# Patient Record
Sex: FEMALE | Race: BLACK OR AFRICAN AMERICAN | Employment: FULL TIME | URBAN - METROPOLITAN AREA
[De-identification: names, ages, dates, MRNs, and addresses within clinical notes are randomized per-mention and may not be internally consistent; named-entity substitution may affect disease eponyms.]

---

## 2019-12-12 ENCOUNTER — HOSPITAL ENCOUNTER (OUTPATIENT)
Dept: LAB | Age: 34
Discharge: HOME | End: 2019-12-12

## 2019-12-12 LAB — XX-LABCORP SPECIMEN COL,LCBCF: NORMAL

## 2019-12-12 PROCEDURE — 99001 SPECIMEN HANDLING PT-LAB: CPT

## 2019-12-18 ENCOUNTER — HOSPITAL ENCOUNTER (INPATIENT)
Age: 34
LOS: 2 days | Discharge: HOME | DRG: 742 | End: 2019-12-20
Attending: OBSTETRICS & GYNECOLOGY | Admitting: OBSTETRICS & GYNECOLOGY
Payer: COMMERCIAL

## 2019-12-18 ENCOUNTER — ANESTHESIA EVENT (OUTPATIENT)
Dept: SURGERY | Age: 34
DRG: 742 | End: 2019-12-18
Payer: COMMERCIAL

## 2019-12-18 ENCOUNTER — ANESTHESIA (OUTPATIENT)
Dept: SURGERY | Age: 34
DRG: 742 | End: 2019-12-18
Payer: COMMERCIAL

## 2019-12-18 PROBLEM — G89.29 CHRONIC PELVIC PAIN IN FEMALE: Status: ACTIVE | Noted: 2019-12-18

## 2019-12-18 PROBLEM — R10.2 CHRONIC PELVIC PAIN IN FEMALE: Status: ACTIVE | Noted: 2019-12-18

## 2019-12-18 PROBLEM — N92.1 MENORRHAGIA WITH IRREGULAR CYCLE: Status: ACTIVE | Noted: 2019-12-18

## 2019-12-18 LAB
ABO + RH BLD: NORMAL
BLOOD GROUP ANTIBODIES SERPL: NORMAL
HCG UR QL: NEGATIVE
HCT VFR BLD AUTO: 36.6 % (ref 35–45)
HGB BLD-MCNC: 11.5 G/DL (ref 12–16)
SPECIMEN EXP DATE BLD: NORMAL

## 2019-12-18 PROCEDURE — 0UT5FZZ RESECTION OF RIGHT FALLOPIAN TUBE, VIA NATURAL OR ARTIFICIAL OPENING WITH PERCUTANEOUS ENDOSCOPIC ASSISTANCE: ICD-10-PCS | Performed by: OBSTETRICS & GYNECOLOGY

## 2019-12-18 PROCEDURE — 0TP98DZ REMOVAL OF INTRALUMINAL DEVICE FROM URETER, VIA NATURAL OR ARTIFICIAL OPENING ENDOSCOPIC: ICD-10-PCS | Performed by: UROLOGY

## 2019-12-18 PROCEDURE — 88307 TISSUE EXAM BY PATHOLOGIST: CPT

## 2019-12-18 PROCEDURE — 36415 COLL VENOUS BLD VENIPUNCTURE: CPT

## 2019-12-18 PROCEDURE — 76060000039 HC ANESTHESIA 4 TO 4.5 HR: Performed by: OBSTETRICS & GYNECOLOGY

## 2019-12-18 PROCEDURE — 77030031139 HC SUT VCRL2 J&J -A: Performed by: OBSTETRICS & GYNECOLOGY

## 2019-12-18 PROCEDURE — 86900 BLOOD TYPING SEROLOGIC ABO: CPT

## 2019-12-18 PROCEDURE — 0TQB8ZZ REPAIR BLADDER, VIA NATURAL OR ARTIFICIAL OPENING ENDOSCOPIC: ICD-10-PCS | Performed by: UROLOGY

## 2019-12-18 PROCEDURE — 99218 HC RM OBSERVATION: CPT

## 2019-12-18 PROCEDURE — 74011000250 HC RX REV CODE- 250: Performed by: NURSE ANESTHETIST, CERTIFIED REGISTERED

## 2019-12-18 PROCEDURE — 74011250636 HC RX REV CODE- 250/636: Performed by: ANESTHESIOLOGY

## 2019-12-18 PROCEDURE — 74011250636 HC RX REV CODE- 250/636: Performed by: OBSTETRICS & GYNECOLOGY

## 2019-12-18 PROCEDURE — 77030008477 HC STYL SATN SLP COVD -A: Performed by: NURSE ANESTHETIST, CERTIFIED REGISTERED

## 2019-12-18 PROCEDURE — 77030018836 HC SOL IRR NACL ICUM -A: Performed by: OBSTETRICS & GYNECOLOGY

## 2019-12-18 PROCEDURE — 0T788DZ DILATION OF BILATERAL URETERS WITH INTRALUMINAL DEVICE, VIA NATURAL OR ARTIFICIAL OPENING ENDOSCOPIC: ICD-10-PCS | Performed by: UROLOGY

## 2019-12-18 PROCEDURE — C1758 CATHETER, URETERAL: HCPCS | Performed by: OBSTETRICS & GYNECOLOGY

## 2019-12-18 PROCEDURE — 77030008608 HC TRCR ENDOSC SMTH AMR -B: Performed by: OBSTETRICS & GYNECOLOGY

## 2019-12-18 PROCEDURE — 77030019927 HC TBNG IRR CYSTO BAXT -A: Performed by: OBSTETRICS & GYNECOLOGY

## 2019-12-18 PROCEDURE — 77030008574 HC TBNG SUC IRR STRY -B: Performed by: OBSTETRICS & GYNECOLOGY

## 2019-12-18 PROCEDURE — 77030002916 HC SUT ETHLN J&J -A: Performed by: OBSTETRICS & GYNECOLOGY

## 2019-12-18 PROCEDURE — 77030003679 HC NDL SPN TERU -A: Performed by: OBSTETRICS & GYNECOLOGY

## 2019-12-18 PROCEDURE — 0UT9FZZ RESECTION OF UTERUS, VIA NATURAL OR ARTIFICIAL OPENING WITH PERCUTANEOUS ENDOSCOPIC ASSISTANCE: ICD-10-PCS | Performed by: OBSTETRICS & GYNECOLOGY

## 2019-12-18 PROCEDURE — 77030020782 HC GWN BAIR PAWS FLX 3M -B: Performed by: OBSTETRICS & GYNECOLOGY

## 2019-12-18 PROCEDURE — 74011250636 HC RX REV CODE- 250/636: Performed by: NURSE ANESTHETIST, CERTIFIED REGISTERED

## 2019-12-18 PROCEDURE — 74011000254 HC RX REV CODE- 254: Performed by: NURSE ANESTHETIST, CERTIFIED REGISTERED

## 2019-12-18 PROCEDURE — 85018 HEMOGLOBIN: CPT

## 2019-12-18 PROCEDURE — 74011000250 HC RX REV CODE- 250: Performed by: OBSTETRICS & GYNECOLOGY

## 2019-12-18 PROCEDURE — 76210000016 HC OR PH I REC 1 TO 1.5 HR: Performed by: OBSTETRICS & GYNECOLOGY

## 2019-12-18 PROCEDURE — 77030003029 HC SUT VCRL J&J -B: Performed by: OBSTETRICS & GYNECOLOGY

## 2019-12-18 PROCEDURE — 77030003578 HC NDL INSUF VERES AMR -B: Performed by: OBSTETRICS & GYNECOLOGY

## 2019-12-18 PROCEDURE — 77030008683 HC TU ET CUF COVD -A: Performed by: NURSE ANESTHETIST, CERTIFIED REGISTERED

## 2019-12-18 PROCEDURE — 77030010030: Performed by: OBSTETRICS & GYNECOLOGY

## 2019-12-18 PROCEDURE — 77030006643: Performed by: NURSE ANESTHETIST, CERTIFIED REGISTERED

## 2019-12-18 PROCEDURE — 77030040361 HC SLV COMPR DVT MDII -B: Performed by: OBSTETRICS & GYNECOLOGY

## 2019-12-18 PROCEDURE — 76010000135 HC OR TIME 4 TO 4.5 HR: Performed by: OBSTETRICS & GYNECOLOGY

## 2019-12-18 PROCEDURE — 77030002888 HC SUT CHRMC J&J -A: Performed by: OBSTETRICS & GYNECOLOGY

## 2019-12-18 PROCEDURE — C1769 GUIDE WIRE: HCPCS | Performed by: OBSTETRICS & GYNECOLOGY

## 2019-12-18 PROCEDURE — 81025 URINE PREGNANCY TEST: CPT

## 2019-12-18 PROCEDURE — 77030012770 HC TRCR OPT FX AMR -B: Performed by: OBSTETRICS & GYNECOLOGY

## 2019-12-18 PROCEDURE — 0DNW4ZZ RELEASE PERITONEUM, PERCUTANEOUS ENDOSCOPIC APPROACH: ICD-10-PCS | Performed by: OBSTETRICS & GYNECOLOGY

## 2019-12-18 PROCEDURE — 77030040830 HC CATH URETH FOL MDII -A: Performed by: OBSTETRICS & GYNECOLOGY

## 2019-12-18 PROCEDURE — 77030020829: Performed by: OBSTETRICS & GYNECOLOGY

## 2019-12-18 PROCEDURE — 74011000258 HC RX REV CODE- 258: Performed by: OBSTETRICS & GYNECOLOGY

## 2019-12-18 PROCEDURE — 77030011264 HC ELECTRD BLD EXT COVD -A: Performed by: OBSTETRICS & GYNECOLOGY

## 2019-12-18 PROCEDURE — 77030005537 HC CATH URETH BARD -A: Performed by: OBSTETRICS & GYNECOLOGY

## 2019-12-18 PROCEDURE — 0TJB8ZZ INSPECTION OF BLADDER, VIA NATURAL OR ARTIFICIAL OPENING ENDOSCOPIC: ICD-10-PCS | Performed by: UROLOGY

## 2019-12-18 RX ORDER — ALBUTEROL SULFATE 0.83 MG/ML
2.5 SOLUTION RESPIRATORY (INHALATION) AS NEEDED
Status: DISCONTINUED | OUTPATIENT
Start: 2019-12-18 | End: 2019-12-18 | Stop reason: HOSPADM

## 2019-12-18 RX ORDER — MAGNESIUM SULFATE 100 %
4 CRYSTALS MISCELLANEOUS AS NEEDED
Status: DISCONTINUED | OUTPATIENT
Start: 2019-12-18 | End: 2019-12-18 | Stop reason: HOSPADM

## 2019-12-18 RX ORDER — SODIUM CHLORIDE, SODIUM LACTATE, POTASSIUM CHLORIDE, CALCIUM CHLORIDE 600; 310; 30; 20 MG/100ML; MG/100ML; MG/100ML; MG/100ML
125 INJECTION, SOLUTION INTRAVENOUS CONTINUOUS
Status: DISCONTINUED | OUTPATIENT
Start: 2019-12-18 | End: 2019-12-20 | Stop reason: HOSPADM

## 2019-12-18 RX ORDER — ACETAMINOPHEN 10 MG/ML
1000 INJECTION, SOLUTION INTRAVENOUS ONCE
Status: COMPLETED | OUTPATIENT
Start: 2019-12-18 | End: 2019-12-18

## 2019-12-18 RX ORDER — DIPHENHYDRAMINE HYDROCHLORIDE 50 MG/ML
12.5 INJECTION, SOLUTION INTRAMUSCULAR; INTRAVENOUS
Status: DISCONTINUED | OUTPATIENT
Start: 2019-12-18 | End: 2019-12-18 | Stop reason: HOSPADM

## 2019-12-18 RX ORDER — KETOROLAC TROMETHAMINE 30 MG/ML
30 INJECTION, SOLUTION INTRAMUSCULAR; INTRAVENOUS EVERY 6 HOURS
Status: DISCONTINUED | OUTPATIENT
Start: 2019-12-18 | End: 2019-12-20 | Stop reason: HOSPADM

## 2019-12-18 RX ORDER — SODIUM CHLORIDE 0.9 % (FLUSH) 0.9 %
5-40 SYRINGE (ML) INJECTION EVERY 8 HOURS
Status: DISCONTINUED | OUTPATIENT
Start: 2019-12-18 | End: 2019-12-20 | Stop reason: HOSPADM

## 2019-12-18 RX ORDER — DEXAMETHASONE SODIUM PHOSPHATE 4 MG/ML
INJECTION, SOLUTION INTRA-ARTICULAR; INTRALESIONAL; INTRAMUSCULAR; INTRAVENOUS; SOFT TISSUE AS NEEDED
Status: DISCONTINUED | OUTPATIENT
Start: 2019-12-18 | End: 2019-12-18 | Stop reason: HOSPADM

## 2019-12-18 RX ORDER — BUPIVACAINE HYDROCHLORIDE AND EPINEPHRINE 5; 5 MG/ML; UG/ML
INJECTION, SOLUTION EPIDURAL; INTRACAUDAL; PERINEURAL AS NEEDED
Status: DISCONTINUED | OUTPATIENT
Start: 2019-12-18 | End: 2019-12-18 | Stop reason: HOSPADM

## 2019-12-18 RX ORDER — PROPOFOL 10 MG/ML
INJECTION, EMULSION INTRAVENOUS AS NEEDED
Status: DISCONTINUED | OUTPATIENT
Start: 2019-12-18 | End: 2019-12-18 | Stop reason: HOSPADM

## 2019-12-18 RX ORDER — FENTANYL CITRATE 50 UG/ML
25 INJECTION, SOLUTION INTRAMUSCULAR; INTRAVENOUS
Status: DISCONTINUED | OUTPATIENT
Start: 2019-12-18 | End: 2019-12-18 | Stop reason: HOSPADM

## 2019-12-18 RX ORDER — LIDOCAINE HYDROCHLORIDE 20 MG/ML
INJECTION, SOLUTION EPIDURAL; INFILTRATION; INTRACAUDAL; PERINEURAL AS NEEDED
Status: DISCONTINUED | OUTPATIENT
Start: 2019-12-18 | End: 2019-12-18 | Stop reason: HOSPADM

## 2019-12-18 RX ORDER — ONDANSETRON 2 MG/ML
4 INJECTION INTRAMUSCULAR; INTRAVENOUS
Status: DISCONTINUED | OUTPATIENT
Start: 2019-12-18 | End: 2019-12-20 | Stop reason: HOSPADM

## 2019-12-18 RX ORDER — ONDANSETRON 2 MG/ML
INJECTION INTRAMUSCULAR; INTRAVENOUS AS NEEDED
Status: DISCONTINUED | OUTPATIENT
Start: 2019-12-18 | End: 2019-12-18 | Stop reason: HOSPADM

## 2019-12-18 RX ORDER — NALOXONE HYDROCHLORIDE 0.4 MG/ML
0.1 INJECTION, SOLUTION INTRAMUSCULAR; INTRAVENOUS; SUBCUTANEOUS AS NEEDED
Status: DISCONTINUED | OUTPATIENT
Start: 2019-12-18 | End: 2019-12-18 | Stop reason: HOSPADM

## 2019-12-18 RX ORDER — OXYCODONE AND ACETAMINOPHEN 5; 325 MG/1; MG/1
1 TABLET ORAL
Status: DISCONTINUED | OUTPATIENT
Start: 2019-12-18 | End: 2019-12-20 | Stop reason: HOSPADM

## 2019-12-18 RX ORDER — CEFAZOLIN SODIUM/WATER 2 G/20 ML
2 SYRINGE (ML) INTRAVENOUS ONCE
Status: COMPLETED | OUTPATIENT
Start: 2019-12-18 | End: 2019-12-18

## 2019-12-18 RX ORDER — DIPHENHYDRAMINE HCL 25 MG
25 CAPSULE ORAL
Status: DISCONTINUED | OUTPATIENT
Start: 2019-12-18 | End: 2019-12-20 | Stop reason: HOSPADM

## 2019-12-18 RX ORDER — ACETAMINOPHEN 325 MG/1
650 TABLET ORAL
Status: DISCONTINUED | OUTPATIENT
Start: 2019-12-18 | End: 2019-12-20 | Stop reason: HOSPADM

## 2019-12-18 RX ORDER — MIDAZOLAM HYDROCHLORIDE 1 MG/ML
INJECTION, SOLUTION INTRAMUSCULAR; INTRAVENOUS AS NEEDED
Status: DISCONTINUED | OUTPATIENT
Start: 2019-12-18 | End: 2019-12-18 | Stop reason: HOSPADM

## 2019-12-18 RX ORDER — HYDROMORPHONE HYDROCHLORIDE 1 MG/ML
1 INJECTION, SOLUTION INTRAMUSCULAR; INTRAVENOUS; SUBCUTANEOUS
Status: DISCONTINUED | OUTPATIENT
Start: 2019-12-18 | End: 2019-12-19

## 2019-12-18 RX ORDER — HYDROCODONE BITARTRATE AND ACETAMINOPHEN 5; 325 MG/1; MG/1
1 TABLET ORAL AS NEEDED
Status: DISCONTINUED | OUTPATIENT
Start: 2019-12-18 | End: 2019-12-18 | Stop reason: HOSPADM

## 2019-12-18 RX ORDER — HYDROMORPHONE HYDROCHLORIDE 1 MG/ML
0.2 INJECTION, SOLUTION INTRAMUSCULAR; INTRAVENOUS; SUBCUTANEOUS AS NEEDED
Status: DISCONTINUED | OUTPATIENT
Start: 2019-12-18 | End: 2019-12-18 | Stop reason: HOSPADM

## 2019-12-18 RX ORDER — EPHEDRINE SULFATE/0.9% NACL/PF 50 MG/5 ML
SYRINGE (ML) INTRAVENOUS AS NEEDED
Status: DISCONTINUED | OUTPATIENT
Start: 2019-12-18 | End: 2019-12-18 | Stop reason: HOSPADM

## 2019-12-18 RX ORDER — ONDANSETRON 2 MG/ML
4 INJECTION INTRAMUSCULAR; INTRAVENOUS ONCE
Status: DISCONTINUED | OUTPATIENT
Start: 2019-12-18 | End: 2019-12-18 | Stop reason: HOSPADM

## 2019-12-18 RX ORDER — SODIUM CHLORIDE 0.9 % (FLUSH) 0.9 %
5-40 SYRINGE (ML) INJECTION AS NEEDED
Status: DISCONTINUED | OUTPATIENT
Start: 2019-12-18 | End: 2019-12-20 | Stop reason: HOSPADM

## 2019-12-18 RX ORDER — ROCURONIUM BROMIDE 10 MG/ML
INJECTION, SOLUTION INTRAVENOUS AS NEEDED
Status: DISCONTINUED | OUTPATIENT
Start: 2019-12-18 | End: 2019-12-18 | Stop reason: HOSPADM

## 2019-12-18 RX ORDER — FENTANYL CITRATE 50 UG/ML
INJECTION, SOLUTION INTRAMUSCULAR; INTRAVENOUS AS NEEDED
Status: DISCONTINUED | OUTPATIENT
Start: 2019-12-18 | End: 2019-12-18 | Stop reason: HOSPADM

## 2019-12-18 RX ORDER — SODIUM CHLORIDE 0.9 % (FLUSH) 0.9 %
5-40 SYRINGE (ML) INJECTION EVERY 8 HOURS
Status: DISCONTINUED | OUTPATIENT
Start: 2019-12-18 | End: 2019-12-18 | Stop reason: HOSPADM

## 2019-12-18 RX ORDER — DEXTROSE MONOHYDRATE 100 MG/ML
125-250 INJECTION, SOLUTION INTRAVENOUS AS NEEDED
Status: DISCONTINUED | OUTPATIENT
Start: 2019-12-18 | End: 2019-12-18 | Stop reason: HOSPADM

## 2019-12-18 RX ORDER — SODIUM CHLORIDE, SODIUM LACTATE, POTASSIUM CHLORIDE, CALCIUM CHLORIDE 600; 310; 30; 20 MG/100ML; MG/100ML; MG/100ML; MG/100ML
150 INJECTION, SOLUTION INTRAVENOUS CONTINUOUS
Status: DISCONTINUED | OUTPATIENT
Start: 2019-12-18 | End: 2019-12-18 | Stop reason: HOSPADM

## 2019-12-18 RX ORDER — ZOLPIDEM TARTRATE 5 MG/1
5 TABLET ORAL
Status: DISCONTINUED | OUTPATIENT
Start: 2019-12-18 | End: 2019-12-20 | Stop reason: HOSPADM

## 2019-12-18 RX ORDER — SODIUM CHLORIDE 0.9 % (FLUSH) 0.9 %
5-40 SYRINGE (ML) INJECTION AS NEEDED
Status: DISCONTINUED | OUTPATIENT
Start: 2019-12-18 | End: 2019-12-18 | Stop reason: HOSPADM

## 2019-12-18 RX ADMIN — Medication 10 ML: at 16:39

## 2019-12-18 RX ADMIN — CEFAZOLIN 1 G: 1 INJECTION, POWDER, FOR SOLUTION INTRAMUSCULAR; INTRAVENOUS at 20:54

## 2019-12-18 RX ADMIN — Medication 10 ML: at 22:43

## 2019-12-18 RX ADMIN — ONDANSETRON 4 MG: 2 INJECTION INTRAMUSCULAR; INTRAVENOUS at 22:40

## 2019-12-18 RX ADMIN — PROMETHAZINE HYDROCHLORIDE 25 MG: 25 INJECTION, SOLUTION INTRAMUSCULAR; INTRAVENOUS at 18:06

## 2019-12-18 RX ADMIN — MIDAZOLAM 2 MG: 1 INJECTION INTRAMUSCULAR; INTRAVENOUS at 08:57

## 2019-12-18 RX ADMIN — HYDROMORPHONE HYDROCHLORIDE 1 MG: 1 INJECTION, SOLUTION INTRAMUSCULAR; INTRAVENOUS; SUBCUTANEOUS at 22:28

## 2019-12-18 RX ADMIN — FENTANYL CITRATE 25 MCG: 50 INJECTION, SOLUTION INTRAMUSCULAR; INTRAVENOUS at 13:58

## 2019-12-18 RX ADMIN — Medication 40 MG: at 09:02

## 2019-12-18 RX ADMIN — ONDANSETRON HYDROCHLORIDE 4 MG: 2 INJECTION INTRAMUSCULAR; INTRAVENOUS at 09:09

## 2019-12-18 RX ADMIN — SODIUM CHLORIDE, SODIUM LACTATE, POTASSIUM CHLORIDE, AND CALCIUM CHLORIDE: 600; 310; 30; 20 INJECTION, SOLUTION INTRAVENOUS at 10:54

## 2019-12-18 RX ADMIN — DEXAMETHASONE SODIUM PHOSPHATE 4 MG: 4 INJECTION, SOLUTION INTRAMUSCULAR; INTRAVENOUS at 09:09

## 2019-12-18 RX ADMIN — ACETAMINOPHEN 1000 MG: 10 INJECTION, SOLUTION INTRAVENOUS at 09:09

## 2019-12-18 RX ADMIN — KETOROLAC TROMETHAMINE 30 MG: 30 INJECTION, SOLUTION INTRAMUSCULAR at 20:54

## 2019-12-18 RX ADMIN — PROPOFOL 200 MG: 10 INJECTION, EMULSION INTRAVENOUS at 09:02

## 2019-12-18 RX ADMIN — ONDANSETRON 4 MG: 2 INJECTION INTRAMUSCULAR; INTRAVENOUS at 16:36

## 2019-12-18 RX ADMIN — FENTANYL CITRATE 50 MCG: 50 INJECTION, SOLUTION INTRAMUSCULAR; INTRAVENOUS at 12:44

## 2019-12-18 RX ADMIN — Medication 10 MG: at 09:47

## 2019-12-18 RX ADMIN — SODIUM CHLORIDE, SODIUM LACTATE, POTASSIUM CHLORIDE, AND CALCIUM CHLORIDE 125 ML/HR: 600; 310; 30; 20 INJECTION, SOLUTION INTRAVENOUS at 07:12

## 2019-12-18 RX ADMIN — KETOROLAC TROMETHAMINE 30 MG: 30 INJECTION, SOLUTION INTRAMUSCULAR at 13:29

## 2019-12-18 RX ADMIN — HYDROMORPHONE HYDROCHLORIDE 1 MG: 1 INJECTION, SOLUTION INTRAMUSCULAR; INTRAVENOUS; SUBCUTANEOUS at 18:04

## 2019-12-18 RX ADMIN — SUGAMMADEX 150 MG: 100 INJECTION, SOLUTION INTRAVENOUS at 13:01

## 2019-12-18 RX ADMIN — FENTANYL CITRATE 50 MCG: 50 INJECTION, SOLUTION INTRAMUSCULAR; INTRAVENOUS at 09:33

## 2019-12-18 RX ADMIN — FENTANYL CITRATE 25 MCG: 50 INJECTION, SOLUTION INTRAMUSCULAR; INTRAVENOUS at 14:08

## 2019-12-18 RX ADMIN — CEFAZOLIN 1 G: 1 INJECTION, POWDER, FOR SOLUTION INTRAMUSCULAR; INTRAVENOUS at 14:03

## 2019-12-18 RX ADMIN — FENTANYL CITRATE 25 MCG: 50 INJECTION, SOLUTION INTRAMUSCULAR; INTRAVENOUS at 14:28

## 2019-12-18 RX ADMIN — LIDOCAINE HYDROCHLORIDE 60 MG: 20 INJECTION, SOLUTION EPIDURAL; INFILTRATION; INTRACAUDAL; PERINEURAL at 09:02

## 2019-12-18 RX ADMIN — INDIGO CARMINE 40 MG: 8 INJECTION, SOLUTION INTRAMUSCULAR; INTRAVENOUS at 10:52

## 2019-12-18 RX ADMIN — FENTANYL CITRATE 100 MCG: 50 INJECTION, SOLUTION INTRAMUSCULAR; INTRAVENOUS at 09:02

## 2019-12-18 RX ADMIN — Medication 2 G: at 09:11

## 2019-12-18 RX ADMIN — FENTANYL CITRATE 25 MCG: 50 INJECTION, SOLUTION INTRAMUSCULAR; INTRAVENOUS at 14:18

## 2019-12-18 NOTE — PERIOP NOTES
TRANSFER - OUT REPORT:    Verbal report given to ANSLEY Ceja (name) on Kandi Sifuentes  being transferred to [address] [place], [place] [address]unit) for routine post - op       Report consisted of patients Situation, Background, Assessment and   Recommendations(SBAR). Information from the following report(s) SBAR, Intake/Output and MAR was reviewed with the receiving nurse. Lines:   Peripheral IV 12/18/19 Left Forearm (Active)   Site Assessment Clean, dry, & intact 12/18/2019  2:10 PM   Phlebitis Assessment 0 12/18/2019  2:10 PM   Infiltration Assessment 0 12/18/2019  2:10 PM   Dressing Status Clean, dry, & intact 12/18/2019  2:10 PM   Dressing Type Transparent;Tape 12/18/2019  2:10 PM   Hub Color/Line Status Green; Infusing 12/18/2019  2:10 PM        Opportunity for questions and clarification was provided.       Patient transported with:   O2 @ 2 liters  Registered Nurse  [facility]

## 2019-12-18 NOTE — PROGRESS NOTES
Transition of Care (SHELBIE) Plan:    Physician follow up     Chart reviewed. Pt admitted for an elective surgical procedure (LAPAROSCOPICALLY ASSISTED VAGINAL HYSTERECTOMY, RIGHT SALPINGECTOMY, CYSTOSCOPY, OPEN ENDED STENT PLACEMENT AND REMOVAL, REPAIR OF INTRAOPERATIVE BLADDER INJURY). Pt is independent. Please encourage ambulation. No transition of care needs identified at this time. Anticipate pt will be medically stable for discharge within the next 24-48 hours with physician follow up. CM available to assist as needed. SHELBIE Transportation:   How is patient being transported at discharge? Family/Friend      When? Once cleared by physician     Is transport scheduled? N/A      Follow-up appointment and transportation:   PCP/Specialist?  See AVS for Appointment         Who is transporting to the follow-up appointment? Self/Family/Friend      Is transport for follow up appointment scheduled? N/A    Communication plan (with patient/family): Who is being called? Patient or Next of Kin? Responsible party? Patient      What number(s) is to be used? See Facesheet      What service provider is calling for [facility] services? When are they calling? Readmission Risk? (Green/Low; Yellow/Moderate; Red/High):  Green    Care Management Interventions  PCP Verified by CM: Yes  Mode of Transport at Discharge:  Other (see comment)(Family/Friend)  Transition of Care Consult (CM Consult): Discharge Planning  Health Maintenance Reviewed: Yes  Current Support Network: Family Lives Nearby  Confirm Follow Up Transport: Self  The Plan for Transition of Care is Related to the Following Treatment Goals : home with physician follow up   Discharge Location  Discharge Placement: Home with family assistance

## 2019-12-18 NOTE — BRIEF OP NOTE
BRIEF OPERATIVE NOTE    Date of Procedure: 12/18/2019   Preoperative Diagnosis: PELVIC PAIN  Postoperative Diagnosis: PELVIC PAIN    Procedure(s):  LAPAROSCOPICALLY ASSISTED VAGINAL HYSTERECTOMY, RIGHT SALPINGECTOMY, CYSTOSCOPY, OPEN ENDED STENT PLACEMENT AND REMOVAL, REPAIR OF INTRAOPERATIVE BLADDER INJURY  Surgeon(s) and Role:      Sivakumar Larios MD - Primary     * Gavino Apodaca MD - Co-Surgeon         Surgical Assistant: none    Surgical Staff:  Circ-1: Roy Fothergill, RN  Circ-Relief: Tono Adan RN  Scrub RN-1: Riki Back RN  Scrub RN-2: Shane Castro RN  Surg Asst-1: Anuj Wagner  Surg Asst-Relief: Roxi DONATO  Event Time In Time Out   Incision Start 3566    Incision Close 1304      Anesthesia: General   Estimated Blood Loss: minimal from urology standpoint  Specimens:   ID Type Source Tests Collected by Time Destination   1 : uterus,cervix, right tube Preservative Uterus  Jennifer Coleman MD 12/18/2019 1003 Pathology      Findings: small hole in bladder midline trigone, both ureter intact   Complications: none  Implants: * No implants in log *

## 2019-12-18 NOTE — ANESTHESIA POSTPROCEDURE EVALUATION
Procedure(s):  LAPAROSCOPICALLY ASSISTED VAGINAL HYSTERECTOMY, RIGHT SALPINGECTOMY, CYSTOSCOPY, OPEN ENDED STENT PLACEMENT AND REMOVAL, REPAIR OF INTRAOPERATIVE BLADDER INJURY. general    Anesthesia Post Evaluation      Multimodal analgesia: multimodal analgesia used between 6 hours prior to anesthesia start to PACU discharge  Patient location during evaluation: PACU  Patient participation: complete - patient participated  Level of consciousness: awake  Pain management: adequate  Airway patency: patent  Anesthetic complications: no  Cardiovascular status: acceptable  Respiratory status: acceptable  Hydration status: acceptable  Post anesthesia nausea and vomiting:  none      Vitals Value Taken Time   /73 12/18/2019  2:10 PM   Temp 36.2 °C (97.2 °F) 12/18/2019  1:16 PM   Pulse 70 12/18/2019  2:14 PM   Resp 8 12/18/2019  2:14 PM   SpO2 100 % 12/18/2019  2:14 PM   Vitals shown include unvalidated device data.

## 2019-12-18 NOTE — PROGRESS NOTES
TRANSFER - IN REPORT:    Verbal report received from [id number] (name) on Diogenes Robertson  being received from PACU (unit) for routine progression of care      Report consisted of patients Situation, Background, Assessment and   Recommendations(SBAR). Information from the following report(s) SBAR, Kardex and MAR was reviewed with the receiving nurse. Opportunity for questions and clarification was provided. Assessment completed upon patients arrival to unit and care assumed. Primary Nurse Miguel Palumbo RN and ANSLEY Obregon performed a dual skin assessment on this patient No impairment noted  Demond score is 20    0730-Bedside and Verbal shift change report given to Fermin Ramirez (oncoming nurse) by Jaron Valerio RN (offgoing nurse). Report included the following information SBAR, Kardex and MAR.

## 2019-12-18 NOTE — OP NOTES
[facility] Operative Procedure Note    Surgeon:   Lucía Price MD  Assistant: none  Name: Luz Marina Leiva   Medical Record Number: [id number]   YOB: 1985  Today's Date: December 18, 2019      Preoperative Diagnosis: PELVIC PAIN, MENORRHAGIA,    Postoperative Diagnosis: PELVIC PAIN, MENORRHAGIA, ADHESIONS, INADVERTENT CYSTOTOMY    Procedure: LAPAOSCOPIC ASSISTED VAGINAL HYSTERECTOMY, RS, LYSIS OF ADHESIONS, CYSTOSCOPY, REPAIR OF CYSTOTOMY (Dr Venkata Segovia). Anesthesia: GENERAL BY General    Assistants Tasks:  Circ-1: Raheel Marquez RN  Circ-Relief: Gustavo Snyder RN  Scrub RN-1: Mela Norman RN  Scrub RN-2: Julia Lucio RN  Surg Asst-1: Brad Sage  Surg Asst-Relief: Makenzie DONATO (also skin closure)        Prophylactic Antibiotics: Ancef 2gm    Estimated Blood Loss: 75CC  Replacement: none, crystalloid only    Specimens:   ID Type Source Tests Collected by Time Destination   1 : uterus,cervix, right tube Preservative Uterus  Sedrick Cabrales MD 12/18/2019 1003 Pathology        Detailed Op Findings:    Pelvic Findings    Uterus Size: enlarged, Fibroids: no.   Adhesions: anterior, bladder to uterus   Tubes normal   Ovaries normal   Omental Adhesions: moderate         Procedure Details:   Patient is induced under general anesthetic, placed supine in lithotomy. Paracervical regional Block is placed using 20 cc total, 0.5% Marcaine with epinephrine. Vaginal speculum is placed, and a Hulka tenaculum is placed in the cervix and covered with a sterile towel.  changed his outer pair of gloves. Local anesthetic was given to umbilicus, small incision made at this site  and a Verres needle was placed through the  incision into the peritoneal cavity. Placement was checked by passing normal saline down, sucking back, and allowing the meniscus to drop under normal negative intra-abdominal pressure.   This condition being satisfied, the peritoneal cavity was inflated with carbon dioxide to a maximum pressure of 20 mm of mercury. Verres needle was then removed, laparoscope and trocar were placed through the upper incision using a 5-mm disposable windowed blade-less trocar, with concurrent viewing of the peritoneal cavity. After placement, immediate inspection was carried out to make sure no damage had been done to underlying structures, which it had not. Under direct vision 2 further sites were injected in the lower abdo wall (R lower quadrant and suprapubic), and small incision is made at each of these sites. 1 further trocar of the same type was then placed through the right lower incision under direct vision with no damage to underlying structures. Another 10mm trocar of the same type, was placed through the central lower incision. Again under direct vision with no damage to underlying structures. Diagnostic laparoscopy was now carried out with findings as mentioned above. Maximum insufflation pressure was now reset to 15 mm of mercury. Pictures were taken as necessary. Ureters were identified. Anterior omental adhesions to anterior peritoneum were cauterized with bipolar cautery and then lysed. The left tube was absent. The right  tube was elevated from the ovary and the mesentery between the 2 carefully cauterized with small amounts of bipolar and cut. We avoided cautery to the ovarian vessels. The right Utero-ovarian ligament, was cauterized with bipolar cautery, in small increments. And cut. Round ligament was cauterized in two places and cut between. The board ligament was similarly cauterized as close as possible to the uterus and cut. The uterine vessels were not cut. A bladder flap was created. . The entire procedure was repeated for the other side. Bladder flap was developed with some difficulty, dissecting the space sharply with scissors cutting into the outer layer of the uterus to avoid the bladder. Patient was now repositioned for vaginal surgery. The cervix was grasped with a double prong tenaculum, and cut all the way around with electrocautery  it from the vaginal vault. Dissection of the plane between the bladder and cervix was begun and the dissection between the posterior vaginal wall and the cervix was begun. A Shashi clamp was placed on each side in turn over the uterosacral ligaments which were clamped cut and tied with Shashi sutures and tagged. Dissection of plane between the bladder and the cervix was completed. Again there was scarring and dissection was carried out posteriorly into the uterus to preserve the bladderand a narrow curved Patience was placed through this incision to protect the bladder. Similarly a plane was dissected between the vaginal vault and the posterior cervix and we entered the peritoneal cavity. A right-angled retractor was placed to protect the rectum. Shashi clamps were now placed in turn of each of the cardinal ligaments, and these were each clamped cut and tied with Shashi sutures. No tag was placed on these pedicles. One further clamp was now placed on the remainder of the cardinal ligament and remainder of the broad ligament and these were clamped cut and tied on each side so that the uterus could be detached. The uterus  And R tube were now removed  without morcellation, through the vagina. A pursestring suture was placed in the visceral peritoneum and closed loosely, after first suctioning blood out of the peritoneal cavity. The vaginal vault was closed transversely, front to back with 3 intermittent figure-of-eight sutures of 1 Vicryl. All sutures used through the vaginal procedure were number 1 Vicryl. Farah catheter was now removed, the patient had already been given intravenous indigo carmine dye. Cystoscopy was performed using normal saline for dilation. Approximately 300 cc was infused. Urine was seen coming with a good strong jet through both ureteric orifices.   Bladder showed a small hole centrally above the trigone. Bladder was drained, cystoscope was removed, and Farah catheter was reinserted. We called dr Ha Pineda, urologist. He performed repair of the bladder (see his note). The patient was now repositioned for laparoscopic surgery and the  changed his outer pair of gloves. Pneumoperitoneum was re-created, laparoscope was reinserted. All clot and debris was now removed from the peritoneal cavity with suction and irrigation where necessary. All pedicles were checked to make sure that they were dry. Extra cautery was applied where necessary. The lower two trocars were removed under direct vision with no bleeding seen internally. As much gas as possible was expelled through the upper trocar. Then 10 cc of local anesthetic was injected down the trocar, and this trocar was also removed. Each incision was closed with intermittent sutures of 4/0 nylon. The lower trocar was closed first with a single fascial closure with fig8 0 vicryl. Sterile dressings were applied, vaginal instruments were removed. Patient was recovered from her general anesthetic  and sent to the recovery room.     Patient's Condition: good    Sponge and needle count as per nursing staff     Signed: Anders Finn MD MD     December 18, 2019

## 2019-12-18 NOTE — PERIOP NOTES
Urine pregnancy results Negative and verified with Neftali CNA. Dual skin assessment completed by Melissa PANCHAL and Brody Huffman RN. Reviewed PTA medication list with patient/caregiver and patient/caregiver denies any additional medications.  Patient admits to having a responsible adult care for them for at least 24 hours after surgery.

## 2019-12-18 NOTE — PROGRESS NOTES
Came to see, explained surgery. Nauseated currently, change antiemetics. Pain reilef as needed. Light bloodstaining of urine. Not unexpected, Good flow.     dt

## 2019-12-18 NOTE — H&P
Gynecologic History and Physical    Subjective:     Date of Admission: 2019    Patient is a 29 y.o. No obstetric history on file. female admitted with CPP and irreg heavy bleeding. For Full history see office chart. For Review of Systems, see Office chart  Past Medical History:   Diagnosis Date    Migraine       Past Surgical History:   Procedure Laterality Date    ABDOMEN SURGERY PROC UNLISTED      liposuction    HX BREAST AUGMENTATION      implant    HX  SECTION      HX GYN  2016    tube removed      Prior to Admission medications    Medication Sig Start Date End Date Taking? Authorizing Provider   ergocalciferol (VITAMIN D2) 50,000 unit capsule Take 50,000 Units by mouth every Wednesday. Yes Provider, Historical   norelgestromin/ethin.estradiol Kay Guzmanri TD) by TransDERmal route every Wednesday. Yes Provider, Historical     No Known Allergies   Social History     Tobacco Use    Smoking status: Never Smoker    Smokeless tobacco: Never Used   Substance Use Topics    Alcohol use: Never     Frequency: Never      History reviewed. No pertinent family history. Objective:     Blood pressure 127/78, pulse 79, temperature 97.3 °F (36.3 °C), resp. rate 16, height 5' 3\" (1.6 m), weight 81.3 kg (179 lb 2 oz), last menstrual period 2019, SpO2 100 %. Temp (24hrs), Av.3 °F (36.3 °C), Min:97.3 °F (36.3 °C), Max:97.3 °F (36.3 °C)        No intake/output data recorded. No intake/output data recorded.     HEENT: No pallor, no jaundice, Thyroid and throat normal  RESPIRATORY: Clear to A & P  CVS: pulse reg, HS normal  ABDOMEN:    tender   Pelvic: See office notes  Data Review:   Recent Results (from the past 24 hour(s))   HCG URINE, QL. - POC    Collection Time: 19  7:17 AM   Result Value Ref Range    Pregnancy test,urine (POC) NEGATIVE  NEG           Assessment:     Active Problems:    Chronic pelvic pain in female (2019)      Menorrhagia with irregular cycle (12/18/2019)        Plan:      LAVH, removal remaining tube and cysto        -Prophylaxis:  Deep Vein Thrombosis Protocol Active: Yes    Check labs:  CBC    Type of admit: 23 Hour    Signed By: Sandy Gutierrez MD                         December 18, 2019

## 2019-12-18 NOTE — BRIEF OP NOTE
BRIEF OPERATIVE NOTE    Date of Procedure: 12/18/2019   Preoperative Diagnosis: PELVIC PAIN  Postoperative Diagnosis: PELVIC PAIN    Procedure(s):  LAPAROSCOPICALLY ASSISTED VAGINAL HYSTERECTOMY, RIGHT SALPINGECTOMY, CYSTOSCOPY, OPEN ENDED STENT PLACEMENT AND REMOVAL, REPAIR OF INTRAOPERATIVE BLADDER INJURY  Surgeon(s) and Role:      Weston Lemus MD - Primary     * Millicent Hull MD - Co-Surgeon           Surgical Staff:  Circ-1: Samara Murillo RN  Circ-Relief: Sebastian Edward RN  Scrub RN-1: Gloria Vargas RN  Scrub RN-2: Dulce Tyler RN  Surg Asst-1: Chandni Yoo  Surg Asst-Relief: Margaret DONATO  Event Time In Time Out   Incision Start 6500    Incision Close       Anesthesia: General   Estimated Blood Loss: 75cc  Specimens:   ID Type Source Tests Collected by Time Destination   1 : uterus,cervix, right tube Preservative Uterus  Romeo Pino MD 12/18/2019 1003 Pathology      Findings: omental adhesions, anterior lower segment scarring to blaader, Cysto: normal ureteric flow, small hole central upper rigone   Complications: cystotomy  Implants: * No implants in log *

## 2019-12-18 NOTE — PERIOP NOTES
Bedside report to Melisa Ashley, RN dressings x3 CDI abdomen, peripad CDI, no questions from Melisa Ashley, [facility]

## 2019-12-18 NOTE — ANESTHESIA PREPROCEDURE EVALUATION
Relevant Problems   No relevant active problems       Anesthetic History   No history of anesthetic complications            Review of Systems / Medical History  Patient summary reviewed, nursing notes reviewed and pertinent labs reviewed    Pulmonary  Within defined limits                 Neuro/Psych   Within defined limits           Cardiovascular                  Exercise tolerance: >4 METS     GI/Hepatic/Renal  Within defined limits              Endo/Other  Within defined limits           Other Findings              Physical Exam    Airway  Mallampati: II  TM Distance: 4 - 6 cm  Neck ROM: normal range of motion   Mouth opening: Normal     Cardiovascular  Regular rate and rhythm,  S1 and S2 normal,  no murmur, click, rub, or gallop             Dental  No notable dental hx       Pulmonary  Breath sounds clear to auscultation               Abdominal  GI exam deferred       Other Findings            Anesthetic Plan    ASA: 2  Anesthesia type: general          Induction: Intravenous  Anesthetic plan and risks discussed with: Patient

## 2019-12-19 PROBLEM — G89.18 POST-OPERATIVE PAIN: Status: ACTIVE | Noted: 2019-12-19

## 2019-12-19 LAB
CREAT SERPL-MCNC: 0.98 MG/DL (ref 0.6–1.3)
ERYTHROCYTE [DISTWIDTH] IN BLOOD BY AUTOMATED COUNT: 14.2 % (ref 11.6–14.5)
HCT VFR BLD AUTO: 37.4 % (ref 35–45)
HGB BLD-MCNC: 11.7 G/DL (ref 12–16)
MCH RBC QN AUTO: 25.4 PG (ref 24–34)
MCHC RBC AUTO-ENTMCNC: 31.3 G/DL (ref 31–37)
MCV RBC AUTO: 81.1 FL (ref 74–97)
PLATELET # BLD AUTO: 271 K/UL (ref 135–420)
PMV BLD AUTO: 10.6 FL (ref 9.2–11.8)
RBC # BLD AUTO: 4.61 M/UL (ref 4.2–5.3)
WBC # BLD AUTO: 14.4 K/UL (ref 4.6–13.2)

## 2019-12-19 PROCEDURE — 65270000029 HC RM PRIVATE

## 2019-12-19 PROCEDURE — 99218 HC RM OBSERVATION: CPT

## 2019-12-19 PROCEDURE — 36415 COLL VENOUS BLD VENIPUNCTURE: CPT

## 2019-12-19 PROCEDURE — 77010033678 HC OXYGEN DAILY

## 2019-12-19 PROCEDURE — 74011000250 HC RX REV CODE- 250: Performed by: OBSTETRICS & GYNECOLOGY

## 2019-12-19 PROCEDURE — 74011250637 HC RX REV CODE- 250/637: Performed by: OBSTETRICS & GYNECOLOGY

## 2019-12-19 PROCEDURE — 82565 ASSAY OF CREATININE: CPT

## 2019-12-19 PROCEDURE — 74011250636 HC RX REV CODE- 250/636: Performed by: OBSTETRICS & GYNECOLOGY

## 2019-12-19 PROCEDURE — 74011250637 HC RX REV CODE- 250/637: Performed by: UROLOGY

## 2019-12-19 PROCEDURE — 85027 COMPLETE CBC AUTOMATED: CPT

## 2019-12-19 RX ORDER — ATROPA BELLADONNA AND OPIUM 16.2; 3 MG/1; MG/1
1 SUPPOSITORY RECTAL
Status: DISCONTINUED | OUTPATIENT
Start: 2019-12-19 | End: 2019-12-20 | Stop reason: HOSPADM

## 2019-12-19 RX ORDER — HYDROMORPHONE HYDROCHLORIDE 1 MG/ML
1 INJECTION, SOLUTION INTRAMUSCULAR; INTRAVENOUS; SUBCUTANEOUS
Status: DISCONTINUED | OUTPATIENT
Start: 2019-12-19 | End: 2019-12-20 | Stop reason: HOSPADM

## 2019-12-19 RX ORDER — PHENAZOPYRIDINE HYDROCHLORIDE 100 MG/1
200 TABLET, FILM COATED ORAL
Status: DISCONTINUED | OUTPATIENT
Start: 2019-12-19 | End: 2019-12-20 | Stop reason: HOSPADM

## 2019-12-19 RX ORDER — FLUCONAZOLE 100 MG/1
200 TABLET ORAL EVERY EVENING
Status: DISCONTINUED | OUTPATIENT
Start: 2019-12-19 | End: 2019-12-20 | Stop reason: HOSPADM

## 2019-12-19 RX ADMIN — CEFAZOLIN 1 G: 1 INJECTION, POWDER, FOR SOLUTION INTRAMUSCULAR; INTRAVENOUS at 02:45

## 2019-12-19 RX ADMIN — HYDROMORPHONE HYDROCHLORIDE 1 MG: 1 INJECTION, SOLUTION INTRAMUSCULAR; INTRAVENOUS; SUBCUTANEOUS at 04:02

## 2019-12-19 RX ADMIN — DIPHENHYDRAMINE HYDROCHLORIDE 25 MG: 25 CAPSULE ORAL at 11:43

## 2019-12-19 RX ADMIN — KETOROLAC TROMETHAMINE 30 MG: 30 INJECTION, SOLUTION INTRAMUSCULAR at 02:45

## 2019-12-19 RX ADMIN — SODIUM CHLORIDE, SODIUM LACTATE, POTASSIUM CHLORIDE, AND CALCIUM CHLORIDE 125 ML/HR: 600; 310; 30; 20 INJECTION, SOLUTION INTRAVENOUS at 19:39

## 2019-12-19 RX ADMIN — KETOROLAC TROMETHAMINE 30 MG: 30 INJECTION, SOLUTION INTRAMUSCULAR at 09:26

## 2019-12-19 RX ADMIN — HYDROMORPHONE HYDROCHLORIDE 1 MG: 1 INJECTION, SOLUTION INTRAMUSCULAR; INTRAVENOUS; SUBCUTANEOUS at 16:39

## 2019-12-19 RX ADMIN — PHENAZOPYRIDINE 200 MG: 100 TABLET ORAL at 09:26

## 2019-12-19 RX ADMIN — CEFAZOLIN 1 G: 1 INJECTION, POWDER, FOR SOLUTION INTRAMUSCULAR; INTRAVENOUS at 13:15

## 2019-12-19 RX ADMIN — PHENAZOPYRIDINE 200 MG: 100 TABLET ORAL at 13:15

## 2019-12-19 RX ADMIN — ATROPA BELLADONNA AND OPIUM 1 SUPPOSITORY: 16.2; 3 SUPPOSITORY RECTAL at 22:00

## 2019-12-19 RX ADMIN — KETOROLAC TROMETHAMINE 30 MG: 30 INJECTION, SOLUTION INTRAMUSCULAR at 21:06

## 2019-12-19 RX ADMIN — KETOROLAC TROMETHAMINE 30 MG: 30 INJECTION, SOLUTION INTRAMUSCULAR at 13:15

## 2019-12-19 RX ADMIN — FLUCONAZOLE 200 MG: 100 TABLET ORAL at 18:16

## 2019-12-19 RX ADMIN — HYDROMORPHONE HYDROCHLORIDE 1 MG: 1 INJECTION, SOLUTION INTRAMUSCULAR; INTRAVENOUS; SUBCUTANEOUS at 10:01

## 2019-12-19 RX ADMIN — DIPHENHYDRAMINE HYDROCHLORIDE 25 MG: 25 CAPSULE ORAL at 18:16

## 2019-12-19 RX ADMIN — ATROPA BELLADONNA AND OPIUM 1 SUPPOSITORY: 16.2; 3 SUPPOSITORY RECTAL at 13:15

## 2019-12-19 RX ADMIN — CEFAZOLIN 1 G: 1 INJECTION, POWDER, FOR SOLUTION INTRAMUSCULAR; INTRAVENOUS at 09:25

## 2019-12-19 RX ADMIN — ZOLPIDEM TARTRATE 5 MG: 5 TABLET ORAL at 21:59

## 2019-12-19 RX ADMIN — Medication 10 ML: at 21:07

## 2019-12-19 RX ADMIN — Medication 10 ML: at 13:17

## 2019-12-19 RX ADMIN — HYDROMORPHONE HYDROCHLORIDE 1 MG: 1 INJECTION, SOLUTION INTRAMUSCULAR; INTRAVENOUS; SUBCUTANEOUS at 08:01

## 2019-12-19 RX ADMIN — PHENAZOPYRIDINE 200 MG: 100 TABLET ORAL at 18:05

## 2019-12-19 RX ADMIN — CEFAZOLIN 1 G: 1 INJECTION, POWDER, FOR SOLUTION INTRAMUSCULAR; INTRAVENOUS at 21:05

## 2019-12-19 NOTE — PROGRESS NOTES
Urology Progress Note    Patient: Jer Turcios MRN: [id number] SSN: [id number]    YOB: 1985  Age: 29 y.o. Sex: female    DOA: 2019 LOS:  LOS: 1 day              Subjective:   Pt having lower abd pain, it appears to be c/w bladder spasms. Objective:      Visit Vitals  /73 (BP 1 Location: Right arm, BP Patient Position: At rest)   Pulse 81   Temp 97.8 °F (36.6 °C)   Resp 16   Ht 5' 3\" (1.6 m)   Wt 81.6 kg (179 lb 14.3 oz)   SpO2 100%   Breastfeeding No   BMI 31.87 kg/m²     Temp (24hrs), Av.9 °F (36.6 °C), Min:97.5 °F (36.4 °C), Max:98.5 °F (36.9 °C)      Intake and Output:   1901 -  0700  In: 3913.7 [I.V.:3913.7]  Out: 2200 [Urine:2125]   0701 -  1900  In: -   Out: 8322 [Urine:1550]    Physical Exam:  Abdomen:  soft, non-tender, non-distended,  Ann: clear  Lab/Data Reviewed:  BMP:   Lab Results   Component Value Date/Time    CREA 0.98 2019 04:16 AM    GFRAA >60 2019 04:16 AM    GFRNA >60 2019 04:16 AM     CBC:   Lab Results   Component Value Date/Time    WBC 14.4 (H) 2019 04:16 AM    HGB 11.7 (L) 2019 04:16 AM    HCT 37.4 2019 04:16 AM     2019 04:16 AM       Medications Reviewed. Assessment/Plan:   Active Problems:    Chronic pelvic pain in female (2019)      Menorrhagia with irregular cycle (2019)      Post-operative pain (2019)        Status Post:  Procedure(s):  LAPAROSCOPICALLY ASSISTED VAGINAL HYSTERECTOMY, RIGHT SALPINGECTOMY, CYSTOSCOPY, OPEN ENDED STENT PLACEMENT AND REMOVAL, REPAIR OF INTRAOPERATIVE BLADDER INJURY   Impression: POST #1  S/P repair bladder laceration    Plan:  1. B &O supp  2. D/C home w ann   3.  My office will schedule cystogram approx 10 days    Gabriela Fine MD  2019

## 2019-12-19 NOTE — PROGRESS NOTES
CM met with pt and her fiance at bedside to discuss transition of care. Pt is independent and her fiance will assist as needed. Please encourage ambulation to assist with identifying potential transition of care needs. No needs have been identified at this time. CM remains available. Care Management Interventions  PCP Verified by CM: Yes  Mode of Transport at Discharge:  Other (see comment)(Family/Friend)  Transition of Care Consult (CM Consult): Discharge Planning  Health Maintenance Reviewed: Yes  Current Support Network: Family Lives [place], Lives with Spouse(lives with fiance)  Confirm Follow Up Transport: Self  The Plan for Transition of Care is Related to the Following Treatment Goals : home with physician follow up   Discharge Location  Discharge Placement: Home with family assistance

## 2019-12-19 NOTE — PROGRESS NOTES
Progress Note    Patient: Kandi Sifuentes MRN: [id number]  SSN: [id number]    YOB: 1985  Age: 29 y.o. Sex: female    ROOM: [facility]/  Subjective:     Postop Day: 1     The patient feels better. Some bladder spasm. The patient denies emotional concerns. The patient is ambulating better. The patient  tolerating a light diet. Flatus denies been passed. BM done denies. Objective:        Vitals table based on RN flowsheet:  No data found. No data found. No data found. No data found.          Patient Vitals for the past 48 hrs:   BP Temp Pulse Resp SpO2 Height Weight   12/19/19 1139 135/73 97.8 °F (36.6 °C) 81 16 100 %     12/19/19 0749 154/83 98.5 °F (36.9 °C) 78 16 100 %     12/19/19 0349 128/78 98.1 °F (36.7 °C) 60 16 100 %     12/18/19 2216 121/69 97.8 °F (36.6 °C) 71 16 100 %  81.6 kg (179 lb 14.3 oz)   12/18/19 1855 137/82 97.5 °F (36.4 °C) 75 16 100 %     12/18/19 1727 133/83 98.1 °F (36.7 °C) 84 16 100 %     12/18/19 1627 122/71 97.6 °F (36.4 °C) 76 16 100 %     12/18/19 1503 118/66 97.4 °F (36.3 °C) 80 16 100 %     12/18/19 1447  97.5 °F (36.4 °C)        12/18/19 1440 121/71  88 11 100 %     12/18/19 1435 115/62  84 12 100 %     12/18/19 1430 118/71  86  100 %     12/18/19 1425 115/69  81 13 100 %     12/18/19 1421   83 12 100 %     12/18/19 1420 115/69  84 13 100 %     12/18/19 1415 119/62  76 10 100 %     12/18/19 1410 123/73  94 12 100 %     12/18/19 1405 121/73  92  100 %     12/18/19 1400 116/73  90 13 100 %     12/18/19 1355 104/87  96 13 100 %     12/18/19 1350 119/72  (!) 104 16 100 %     12/18/19 1345 125/71  (!) 101 17 100 %     12/18/19 1340 120/74  91 13 100 %     12/18/19 1335 125/69  (!) 104 17 100 %     12/18/19 1330 115/74  (!) 102 19 100 %     12/18/19 1325 92/64  (!) 109 15 100 %     12/18/19 1320 115/66  (!) 122 19 100 %     12/18/19 1316 (!) 122/7 97.2 °F (36.2 °C) (!) 117 19 100 %     12/18/19 1315 122/76         12/18/19 0654 127/78 97.3 °F (36.3 °C) 79 16 100 % 5' 3\" (1.6 m) 81.3 kg (179 lb 2 oz)       Objective:      Patient Vitals for the past 24 hrs:   BP Temp Pulse Resp SpO2 Weight   12/19/19 1139 135/73 97.8 °F (36.6 °C) 81 16 100 %    12/19/19 0749 154/83 98.5 °F (36.9 °C) 78 16 100 %    12/19/19 0349 128/78 98.1 °F (36.7 °C) 60 16 100 %    12/18/19 2216 121/69 97.8 °F (36.6 °C) 71 16 100 % 81.6 kg (179 lb 14.3 oz)   12/18/19 1855 137/82 97.5 °F (36.4 °C) 75 16 100 %      LABS: Recent Results (from the past 48 hour(s))   HCG URINE, QL. - POC    Collection Time: 12/18/19  7:17 AM   Result Value Ref Range    Pregnancy test,urine (POC) NEGATIVE  NEG     TYPE & SCREEN    Collection Time: 12/18/19  7:33 AM   Result Value Ref Range    Crossmatch Expiration 12/21/2019     ABO/Rh(D) Martin Maxon POSITIVE     Antibody screen NEG    HGB & HCT    Collection Time: 12/18/19  4:33 PM   Result Value Ref Range    HGB 11.5 (L) 12.0 - 16.0 g/dL    HCT 36.6 35.0 - 45.0 %   CBC W/O DIFF    Collection Time: 12/19/19  4:16 AM   Result Value Ref Range    WBC 14.4 (H) 4.6 - 13.2 K/uL    RBC 4.61 4.20 - 5.30 M/uL    HGB 11.7 (L) 12.0 - 16.0 g/dL    HCT 37.4 35.0 - 45.0 %    MCV 81.1 74.0 - 97.0 FL    MCH 25.4 24.0 - 34.0 PG    MCHC 31.3 31.0 - 37.0 g/dL    RDW 14.2 11.6 - 14.5 %    PLATELET 727 479 - 799 K/uL    MPV 10.6 9.2 - 11.8 FL   CREATININE    Collection Time: 12/19/19  4:16 AM   Result Value Ref Range    Creatinine 0.98 0.6 - 1.3 MG/DL    GFR est AA >60 >60 ml/min/1.73m2    GFR est non-AA >60 >60 ml/min/1.73m2              Abdomen:  Soft. notdistended, bowel sounds present    not tender   Incision:  no significant drainage, no dehiscence, no significant erythema   DVT Evaluation:  No evidence of DVT seen on physical exam.  Negative Xiang's sign. No cords or calf tenderness. No significant calf/ankle edema. Lab/Data Review: All lab results for the last 24 hours reviewed.     Assessment:     Status postop: Postoperative course complicated by pain, still no flatus      Surgery discussed with patient and further management in hospital. Long term prognosis discussed          Risk of deterioration: low      Disposition:  Home w/Family tomorrow          Plan: Mobilize,  Postop care discussed including diet, ambulation, and actvitiy restrictions. Discharge instructions and questions answered.     Signed By: Faby Ariza MD     December 19, 2019

## 2019-12-19 NOTE — PROGRESS NOTES
0720- Bedside and Verbal shift change report given to Reagan Jones RN (oncoming nurse) by Alfonso Mejía RN (offgoing nurse). Report included the following information SBAR, Kardex and MAR.     1145-Patient complaints of itching. Benadryl administered    1725- Bedside and Verbal shift change report given to Hugh Jorge RN (oncoming nurse) by Reagan Jones RN (offgoing nurse). Report included the following information SBAR, Kardex and MAR.

## 2019-12-19 NOTE — OP NOTES
[facility] [place]  OPERATIVE REPORT    Name:  Raman Haines  MR#:   [id number]  :  1985  ACCOUNT #:  [de-identified]  DATE OF SERVICE:  2019    PREOPERATIVE DIAGNOSES:  1.  Pelvic pain. 2.  Bladder injury. POSTOPERATIVE DIAGNOSES:  1.  Pelvic pain. 2.  Bladder injury. PROCEDURES PERFORMED:  1. Laparoscopic-assisted vaginal hysterectomy, right salpingectomy by Dr. Kasia Sellers. 2.  Procedure by Dr. Deni Norman, Urology, cystoscopy, placement of bilateral open-ended stents, removal of bilateral open-ended stents, repair of intraoperative bladder injury. SURGEONS:  1.  Dr. Kasia Sellers, primary for GYN procedure. 2.  Cytology procedure by Dr. Deni Norman for urology procedure. ASSISTANT:  None. ANESTHESIA:  General.    COMPLICATIONS:  None. SPECIMENS REMOVED:  none. IMPLANTS:  None. ESTIMATED BLOOD LOSS:  From urology standpoint was minimal.    FINDINGS:  Small hole in the bladder midline, distal through the trigone. Both the ureters were completely intact. PROCEDURE:  Urologic procedure is as follows:  I was contacted by Dr. Kasia Sellers that he noted an injury to the bladder while performing a laparoscopic-assisted vaginal hysterectomy. When I arrived, Dr. Kasia Sellers had given the patient indigo carmine, and it was stated that there was clear efflux of urine from both ureters. At this point, I wanted to confirm that there was no injury to either ureter and, in addition, make sure that I was able to protect the ureters during closure of the bladder injury. I began by performing cystourethroscopy with a 21-Egyptian cystoscope. I identified both ureteral orifices and saw blue efflux of urine. I also noted that there was small 1 cm injury to the bladder near the trigone and more towards the posterior aspect of the bladder. I was able to intubate the right ureteral orifice without difficulty with 5-Egyptian open-ended catheter. The same was performed on the opposite side.   So at this point, I had two open-ended catheters that were now draining blue urine. I removed the cystoscope, had a difficult time still finding the small hole in the bladder. I placed the cystoscope back into the bladder, identified the area of injury, and we were able to see the injury with the light from the cystoscope. In order to identify the area and allow me to isolate the area that needed to be fixed, I passed a sensor wire through the cystoscope through the open end of the bladder and out into the vagina. These ends were then clamped with mosquito clamp. I removed the cystoscope and placed a Farah catheter. At this point, I was able to now clearly identify the small injury to the bladder. I began by closing it in a 2-layer fashion with a 2-0 chromic suture initially on the mucosa layer. Once I had the mucosal layer almost completely closed, I removed the wire. I then used 2-0 Vicryl suture to close the muscle layer over the mucosal layer in a running fashion, but there still remained blue urine from the open-ended catheters. I removed these open-ended catheters, and with the Farah still in place, I filled the Farah with aseptic syringe with over 200 mL of saline stained with blue indigo carmine. There was no evidence of any leakage from the bladder at this time. The Farah catheter and the bladder was drained. The catheter was then hooked up to a catheter drainage bag. At this point, urologic portion of procedure was completed. The patient tolerated the urology portion of the procedure well. For any additional information, please see Dr. Ilana Alanis's dictation.       Anatoliy Martinez MD      [id number]  D:  12/18/2019 19:07  T:  12/19/2019 2:29  JOB #:  [id number]

## 2019-12-19 NOTE — PROGRESS NOTES
Bedside and Verbal shift change report received from Lilia Davila (offgoing nurse). Report included the following information SBAR, Kardex, Intake/Output, MAR and Recent Results. 2045 Shift assessment completed, see EMR    0400 Reassessment completed, see EMR    Bedside and Verbal shift change report given to Lilia Davila RN (oncoming nurse) . Report included the following information SBAR, Kardex, Intake/Output, MAR and Recent Results.

## 2019-12-19 NOTE — PROGRESS NOTES
Progress Note    Patient: Ferny Sánchez MRN: [id number]  SSN: [id number]    YOB: 1985  Age: 29 y.o. Sex: female    ROOM: [facility]/01  Subjective:     Postop Day: 1     The patient feels well. The patient denies emotional concerns, but complaining of irritability, lack of sleep. The patient is not ambulating well. The patient  tolerating a light diet. Flatus denies been passed. BM done denies.      Objective:        Vitals table based on RN flowsheet:  Patient Vitals for the past 4 hrs:   Temp   12/19/19 0349 98.1 °F (36.7 °C)    Patient Vitals for the past 4 hrs:   Pulse   12/19/19 0349 60    Patient Vitals for the past 4 hrs:   Resp   12/19/19 0349 16    Patient Vitals for the past 4 hrs:   BP   12/19/19 0349 128/78            Patient Vitals for the past 48 hrs:   BP Temp Pulse Resp SpO2 Height Weight   12/19/19 0349 128/78 98.1 °F (36.7 °C) 60 16 100 %     12/18/19 2216 121/69 97.8 °F (36.6 °C) 71 16 100 %  81.6 kg (179 lb 14.3 oz)   12/18/19 1855 137/82 97.5 °F (36.4 °C) 75 16 100 %     12/18/19 1727 133/83 98.1 °F (36.7 °C) 84 16 100 %     12/18/19 1627 122/71 97.6 °F (36.4 °C) 76 16 100 %     12/18/19 1503 118/66 97.4 °F (36.3 °C) 80 16 100 %     12/18/19 1447  97.5 °F (36.4 °C)        12/18/19 1440 121/71  88 11 100 %     12/18/19 1435 115/62  84 12 100 %     12/18/19 1430 118/71  86  100 %     12/18/19 1425 115/69  81 13 100 %     12/18/19 1421   83 12 100 %     12/18/19 1420 115/69  84 13 100 %     12/18/19 1415 119/62  76 10 100 %     12/18/19 1410 123/73  94 12 100 %     12/18/19 1405 121/73  92  100 %     12/18/19 1400 116/73  90 13 100 %     12/18/19 1355 104/87  96 13 100 %     12/18/19 1350 119/72  (!) 104 16 100 %     12/18/19 1345 125/71  (!) 101 17 100 %     12/18/19 1340 120/74  91 13 100 %     12/18/19 1335 125/69  (!) 104 17 100 %     12/18/19 1330 115/74  (!) 102 19 100 %     12/18/19 1325 92/64  (!) 109 15 100 %     12/18/19 1320 115/66  (!) 122 19 100 %     12/18/19 1316 (!) 122/7 97.2 °F (36.2 °C) (!) 117 19 100 %     12/18/19 1315 122/76         12/18/19 0654 127/78 97.3 °F (36.3 °C) 79 16 100 % 5' 3\" (1.6 m) 81.3 kg (179 lb 2 oz)       Objective:      Patient Vitals for the past 24 hrs:   BP Temp Pulse Resp SpO2 Weight   12/19/19 0349 128/78 98.1 °F (36.7 °C) 60 16 100 %    12/18/19 2216 121/69 97.8 °F (36.6 °C) 71 16 100 % 81.6 kg (179 lb 14.3 oz)   12/18/19 1855 137/82 97.5 °F (36.4 °C) 75 16 100 %    12/18/19 1727 133/83 98.1 °F (36.7 °C) 84 16 100 %    12/18/19 1627 122/71 97.6 °F (36.4 °C) 76 16 100 %    12/18/19 1503 118/66 97.4 °F (36.3 °C) 80 16 100 %    12/18/19 1447  97.5 °F (36.4 °C)       12/18/19 1440 121/71  88 11 100 %    12/18/19 1435 115/62  84 12 100 %    12/18/19 1430 118/71  86  100 %    12/18/19 1425 115/69  81 13 100 %    12/18/19 1421   83 12 100 %    12/18/19 1420 115/69  84 13 100 %    12/18/19 1415 119/62  76 10 100 %    12/18/19 1410 123/73  94 12 100 %    12/18/19 1405 121/73  92  100 %    12/18/19 1400 116/73  90 13 100 %    12/18/19 1355 104/87  96 13 100 %    12/18/19 1350 119/72  (!) 104 16 100 %    12/18/19 1345 125/71  (!) 101 17 100 %    12/18/19 1340 120/74  91 13 100 %    12/18/19 1335 125/69  (!) 104 17 100 %    12/18/19 1330 115/74  (!) 102 19 100 %    12/18/19 1325 92/64  (!) 109 15 100 %    12/18/19 1320 115/66  (!) 122 19 100 %    12/18/19 1316 (!) 122/7 97.2 °F (36.2 °C) (!) 117 19 100 %    12/18/19 1315 122/76          LABS: Recent Results (from the past 48 hour(s))   HCG URINE, QL. - POC    Collection Time: 12/18/19  7:17 AM   Result Value Ref Range    Pregnancy test,urine (POC) NEGATIVE  NEG     TYPE & SCREEN    Collection Time: 12/18/19  7:33 AM   Result Value Ref Range    Crossmatch Expiration 12/21/2019     ABO/Rh(D) Elaine Browning POSITIVE     Antibody screen NEG    HGB & HCT    Collection Time: 12/18/19  4:33 PM Result Value Ref Range    HGB 11.5 (L) 12.0 - 16.0 g/dL    HCT 36.6 35.0 - 45.0 %   CBC W/O DIFF    Collection Time: 12/19/19  4:16 AM   Result Value Ref Range    WBC 14.4 (H) 4.6 - 13.2 K/uL    RBC 4.61 4.20 - 5.30 M/uL    HGB 11.7 (L) 12.0 - 16.0 g/dL    HCT 37.4 35.0 - 45.0 %    MCV 81.1 74.0 - 97.0 FL    MCH 25.4 24.0 - 34.0 PG    MCHC 31.3 31.0 - 37.0 g/dL    RDW 14.2 11.6 - 14.5 %    PLATELET 080 458 - 275 K/uL    MPV 10.6 9.2 - 11.8 FL   CREATININE    Collection Time: 12/19/19  4:16 AM   Result Value Ref Range    Creatinine 0.98 0.6 - 1.3 MG/DL    GFR est AA >60 >60 ml/min/1.73m2    GFR est non-AA >60 >60 ml/min/1.73m2              Abdomen:  Soft. notdistended, bowel sounds present    moderately tender   Incision:  no significant drainage, no dehiscence, no significant erythema   DVT Evaluation:  No evidence of DVT seen on physical exam.  Negative Xiang's sign. No cords or calf tenderness. No significant calf/ankle edema. Lab/Data Review: All lab results for the last 24 hours reviewed. Assessment:     Status postop:  Postoperative course complicated by pain. Surgery discussed with patient and further management in hospital. Long term prognosis discussed    Will try 2doses dilaudid back to back (q2hr) to sleep this am.      Risk of deterioration: low      Disposition:  Home w/Family and when able          Plan: Mobilize,   Pain relief discussed  Postop care discussed including diet, ambulation, and actvitiy restrictions. Discharge instructions and questions answered.     Signed By: Dandre Quiroz MD     December 19, 2019

## 2019-12-20 VITALS
WEIGHT: 179.9 LBS | DIASTOLIC BLOOD PRESSURE: 85 MMHG | RESPIRATION RATE: 17 BRPM | HEIGHT: 63 IN | SYSTOLIC BLOOD PRESSURE: 150 MMHG | BODY MASS INDEX: 31.88 KG/M2 | TEMPERATURE: 98.4 F | HEART RATE: 73 BPM | OXYGEN SATURATION: 99 %

## 2019-12-20 LAB
BASOPHILS # BLD: 0 K/UL (ref 0–0.1)
BASOPHILS NFR BLD: 0 % (ref 0–2)
DIFFERENTIAL METHOD BLD: ABNORMAL
EOSINOPHIL # BLD: 0 K/UL (ref 0–0.4)
EOSINOPHIL NFR BLD: 0 % (ref 0–5)
ERYTHROCYTE [DISTWIDTH] IN BLOOD BY AUTOMATED COUNT: 14.4 % (ref 11.6–14.5)
HCT VFR BLD AUTO: 32 % (ref 35–45)
HGB BLD-MCNC: 9.8 G/DL (ref 12–16)
LYMPHOCYTES # BLD: 3.4 K/UL (ref 0.9–3.6)
LYMPHOCYTES NFR BLD: 35 % (ref 21–52)
MCH RBC QN AUTO: 25 PG (ref 24–34)
MCHC RBC AUTO-ENTMCNC: 30.6 G/DL (ref 31–37)
MCV RBC AUTO: 81.6 FL (ref 74–97)
MONOCYTES # BLD: 0.5 K/UL (ref 0.05–1.2)
MONOCYTES NFR BLD: 5 % (ref 3–10)
NEUTS SEG # BLD: 5.9 K/UL (ref 1.8–8)
NEUTS SEG NFR BLD: 60 % (ref 40–73)
PLATELET # BLD AUTO: 251 K/UL (ref 135–420)
PMV BLD AUTO: 10.3 FL (ref 9.2–11.8)
RBC # BLD AUTO: 3.92 M/UL (ref 4.2–5.3)
WBC # BLD AUTO: 9.8 K/UL (ref 4.6–13.2)

## 2019-12-20 PROCEDURE — 74011250636 HC RX REV CODE- 250/636: Performed by: OBSTETRICS & GYNECOLOGY

## 2019-12-20 PROCEDURE — C1751 CATH, INF, PER/CENT/MIDLINE: HCPCS

## 2019-12-20 PROCEDURE — 74011000250 HC RX REV CODE- 250: Performed by: OBSTETRICS & GYNECOLOGY

## 2019-12-20 PROCEDURE — 74011250637 HC RX REV CODE- 250/637: Performed by: OBSTETRICS & GYNECOLOGY

## 2019-12-20 PROCEDURE — 77030040831 HC BAG URINE DRNG MDII -A

## 2019-12-20 PROCEDURE — 90471 IMMUNIZATION ADMIN: CPT

## 2019-12-20 PROCEDURE — 85025 COMPLETE CBC W/AUTO DIFF WBC: CPT

## 2019-12-20 PROCEDURE — 36415 COLL VENOUS BLD VENIPUNCTURE: CPT

## 2019-12-20 PROCEDURE — 90686 IIV4 VACC NO PRSV 0.5 ML IM: CPT | Performed by: OBSTETRICS & GYNECOLOGY

## 2019-12-20 RX ADMIN — CEFAZOLIN 1 G: 1 INJECTION, POWDER, FOR SOLUTION INTRAMUSCULAR; INTRAVENOUS at 03:55

## 2019-12-20 RX ADMIN — HYDROMORPHONE HYDROCHLORIDE 1 MG: 1 INJECTION, SOLUTION INTRAMUSCULAR; INTRAVENOUS; SUBCUTANEOUS at 04:44

## 2019-12-20 RX ADMIN — KETOROLAC TROMETHAMINE 30 MG: 30 INJECTION, SOLUTION INTRAMUSCULAR at 03:55

## 2019-12-20 RX ADMIN — SODIUM CHLORIDE, SODIUM LACTATE, POTASSIUM CHLORIDE, AND CALCIUM CHLORIDE 125 ML/HR: 600; 310; 30; 20 INJECTION, SOLUTION INTRAVENOUS at 04:00

## 2019-12-20 RX ADMIN — INFLUENZA VIRUS VACCINE 0.5 ML: 15; 15; 15; 15 SUSPENSION INTRAMUSCULAR at 09:45

## 2019-12-20 RX ADMIN — PHENAZOPYRIDINE 200 MG: 100 TABLET ORAL at 08:53

## 2019-12-20 RX ADMIN — CEFAZOLIN 1 G: 1 INJECTION, POWDER, FOR SOLUTION INTRAMUSCULAR; INTRAVENOUS at 08:53

## 2019-12-20 RX ADMIN — KETOROLAC TROMETHAMINE 30 MG: 30 INJECTION, SOLUTION INTRAMUSCULAR at 08:53

## 2019-12-20 NOTE — DISCHARGE INSTRUCTIONS
Patient Education        Laparoscopic Hysterectomy: What to Expect at Home  Your Recovery    A hysterectomy is surgery to take out the uterus. In some cases, the ovaries and fallopian tubes also are taken out at the same time. You can expect to feel better and stronger each day, but you may need pain medicine for a week or two. You may get tired easily or have less energy than usual. The tiredness may last for several weeks after surgery. You will probably notice that your belly is swollen and puffy. This is common. The swelling will take several weeks to go down. You may take about 4 to 6 weeks to fully recover. It is important to avoid lifting while you are recovering so that you can heal.  This care sheet gives you a general idea about how long it will take for you to recover. But each person recovers at a different pace. Follow the steps below to get better as quickly as possible. How can you care for yourself at home? Activity    · Rest when you feel tired.     · Be active. Walking is a good choice.     · Allow the area to heal. Don't move quickly or lift anything heavy until you are feeling better.     · You may shower 24 to 48 hours after surgery, if your doctor okays it. Pat the incision dry. Do not take a bath for the first 2 weeks, or until your doctor tells you it is okay.     · Ask your doctor when it is okay for you to have sex. Diet    · You can eat your normal diet. If your stomach is upset, try bland, low-fat foods like plain rice, broiled chicken, toast, and yogurt.     · If your bowel movements are not regular right after surgery, try to avoid constipation and straining. Drink plenty of water. Your doctor may suggest fiber, a stool softener, or a mild laxative. Medicines    · Your doctor will tell you if and when you can restart your medicines.  He or she will also give you instructions about taking any new medicines.     · If you take blood thinners, such as warfarin (Coumadin), clopidogrel (Plavix), or aspirin, be sure to talk to your doctor. He or she will tell you if and when to start taking those medicines again. Make sure that you understand exactly what your doctor wants you to do.     · Be safe with medicines. Read and follow all instructions on the label. ? If the doctor gave you a prescription medicine for pain, take it as prescribed. ? If you are not taking a prescription pain medicine, ask your doctor if you can take an over-the-counter medicine.     · If your doctor prescribed antibiotics, take them as directed. Do not stop taking them just because you feel better. You need to take the full course of antibiotics. Incision care    · You may have stitches over the cuts (incisions) the doctor made in your belly.     · If you have strips of tape on the cut (incision) the doctor made, leave the tape on for a week or until it falls off.     · Wash the area daily with warm, soapy water, and pat it dry. Don't use hydrogen peroxide or alcohol. They can slow healing.     · You may cover the area with a gauze bandage if it oozes fluid or rubs against clothing.     · Change the bandage every day. Other instructions    · You may have some light vaginal bleeding. Wear sanitary pads if needed. Do not douche or use tampons.     · Don't have sex until the doctor says it is okay. Follow-up care is a key part of your treatment and safety. Be sure to make and go to all appointments, and call your doctor if you are having problems. It's also a good idea to know your test results and keep a list of the medicines you take. When should you call for help? Call 911 anytime you think you may need emergency care. For example, call if:    · You passed out (lost consciousness).     · You have chest pain, are short of breath, or cough up blood.    Call your doctor now or seek immediate medical care if:    · You have pain that does not get better after you take pain medicine.     · You cannot pass stools or gas.   · You have vaginal discharge that has increased in amount or smells bad.     · You are sick to your stomach or cannot drink fluids.     · You have loose stitches, or your incision comes open.     · Bright red blood has soaked through the bandage over your incision.     · You have signs of infection, such as:  ? Increased pain, swelling, warmth, or redness. ? Red streaks leading from the incision. ? Pus draining from the incision. ? A fever.     · You have bright red vaginal bleeding that soaks one or more pads in an hour, or you have large clots.     · You have signs of a blood clot in your leg (called a deep vein thrombosis), such as:  ? Pain in your calf, back of the knee, thigh, or groin. ? Redness and swelling in your leg or groin.    Watch closely for changes in your health, and be sure to contact your doctor if you have any problems. Where can you learn more? Go to http://cindy-ho.info/. Enter Q131 in the search box to learn more about \"Laparoscopic Hysterectomy: What to Expect at Home. \"  Current as of: February 19, 2019  Content Version: 12.2  © 0744-4145 [employer]. Care instructions adapted under license by [employer] (which disclaims liability or warranty for this information). If you have questions about a medical condition or this instruction, always ask your healthcare professional. [address] any warranty or liability for your use of this information.

## 2019-12-20 NOTE — PROGRESS NOTES
0930 ann cath care instructed, supplies for leg bag and extra ann bag was given. All question was answered politely.

## 2019-12-20 NOTE — ROUTINE PROCESS
Bedside and Verbal shift change report given to ELMER Tejada RN (oncoming nurse) by Brendon Coronado RN 
 (offgoing nurse). Report included the following information SBAR, Kardex, Procedure Summary, Intake/Output, MAR and Recent Results.

## 2019-12-20 NOTE — PROGRESS NOTES
Gynecology Progress Note    Patient doing well post-op day 2 from Procedure(s):  LAPAROSCOPICALLY ASSISTED VAGINAL HYSTERECTOMY, RIGHT SALPINGECTOMY, CYSTOSCOPY, OPEN ENDED STENT PLACEMENT AND REMOVAL, REPAIR OF INTRAOPERATIVE BLADDER INJURY without significant complaints. Pain controlled on current medication. Voiding without difficulty. Patient is passing flatus. Vitals:  Blood pressure 150/85, pulse 73, temperature 98.4 °F (36.9 °C), resp. rate 17, height 5' 3\" (1.6 m), weight 81.6 kg (179 lb 14.3 oz), last menstrual period 2019, SpO2 99 %, not currently breastfeeding. Temp (24hrs), Av.1 °F (36.7 °C), Min:97.5 °F (36.4 °C), Max:98.5 °F (36.9 °C)        Exam:  Patient without distress. Abdomen soft,  nontender. Incision dry and clean without erythema. Lower extremities are negative for swelling, cords, or tenderness. Labs:   Recent Results (from the past 24 hour(s))   CBC WITH AUTOMATED DIFF    Collection Time: 19  3:20 AM   Result Value Ref Range    WBC 9.8 4.6 - 13.2 K/uL    RBC 3.92 (L) 4.20 - 5.30 M/uL    HGB 9.8 (L) 12.0 - 16.0 g/dL    HCT 32.0 (L) 35.0 - 45.0 %    MCV 81.6 74.0 - 97.0 FL    MCH 25.0 24.0 - 34.0 PG    MCHC 30.6 (L) 31.0 - 37.0 g/dL    RDW 14.4 11.6 - 14.5 %    PLATELET 801 288 - 658 K/uL    MPV 10.3 9.2 - 11.8 FL    NEUTROPHILS 60 40 - 73 %    LYMPHOCYTES 35 21 - 52 %    MONOCYTES 5 3 - 10 %    EOSINOPHILS 0 0 - 5 %    BASOPHILS 0 0 - 2 %    ABS. NEUTROPHILS 5.9 1.8 - 8.0 K/UL    ABS. LYMPHOCYTES 3.4 0.9 - 3.6 K/UL    ABS. MONOCYTES 0.5 0.05 - 1.2 K/UL    ABS. EOSINOPHILS 0.0 0.0 - 0.4 K/UL    ABS. BASOPHILS 0.0 0.0 - 0.1 K/UL    DF AUTOMATED         Assessment and Plan:  Patient appears to be having uncomplicated post Procedure(s):  LAPAROSCOPICALLY ASSISTED VAGINAL HYSTERECTOMY, RIGHT SALPINGECTOMY, CYSTOSCOPY, OPEN ENDED STENT PLACEMENT AND REMOVAL, REPAIR OF INTRAOPERATIVE BLADDER INJURY course.   Continue routine post-op care.

## 2019-12-20 NOTE — PROGRESS NOTES
conducted an initial consultation and Spiritual Assessment for Mohan Ceja, who is a 29 y.o.,female. According to the patients chart Restorationism Affiliation is: Linda. The reason the Patient came to the hospital is:   Patient Active Problem List    Diagnosis Date Noted    Post-operative pain 12/19/2019    Chronic pelvic pain in female 12/18/2019    Menorrhagia with irregular cycle 12/18/2019        The  provided the following Interventions:  Initiated a relationship of care and support. Explored issues of berto, belief, spirituality and Anglican/ritual needs while hospitalized. Listened empathically. Provided information about Spiritual Care Services. Offered prayer and assurance of continued prayers on patients behalf. Chart reviewed. The following outcomes were achieved:  Patient shared limited information about their medical narrative, spiritual journey and beliefs.  confirmed Patient's Restorationism Affiliation. Patient processed feelings about current hospitalization. Patient expressed gratitude for Spiritual Care visit. Assessment:  There are no significant spiritual or Anglican issues which require further intervention at this time. Patient does not have any Anglican or cultural needs that will affect patients preferences in health care. Plan:  Chaplains will continue to follow and will provide pastoral care as needed or requested.  recommends bedside caregivers page  on duty if patient shows signs of acute spiritual or emotional distress. [address] Jose Carranza M.Div.   [address] [place] [address] Drive  [phone] - Office

## 2019-12-20 NOTE — PROGRESS NOTES
Pt resting in bed, concerned that urine is \"bloody\" - informed pt that it is orange in colored and due to medication, stated that B&O supp helps with pain more than pain med. Ambien given for sleep. 0030 Pt declined to ambulate    0430 Pt refused ambulation at this time, stating she needs more sleep since she had no sleep the other night. Denied passing flatus. 0630 Pt ambulated in hallway accompanied by CNA. 0750 Pt asked during shift report if we rounded on her when she was asleep. Informed pt that \"rounding was done every hour to make sure she was okay and breathing normally but didn't wake her up per her request so she can sleep\"     0830 Passed on to ELMER Tejada RN, to informed pt to get prescription from MD's office when discharged

## 2019-12-30 ENCOUNTER — HOSPITAL ENCOUNTER (OUTPATIENT)
Dept: GENERAL RADIOLOGY | Age: 34
Discharge: HOME | End: 2019-12-30
Attending: UROLOGY
Payer: COMMERCIAL

## 2019-12-30 DIAGNOSIS — S37.29XA: ICD-10-CM

## 2019-12-30 PROCEDURE — 74011636320 HC RX REV CODE- 636/320: Performed by: UROLOGY

## 2019-12-30 PROCEDURE — 74430 CONTRAST X-RAY BLADDER: CPT

## 2019-12-30 PROCEDURE — 51600 INJECTION FOR BLADDER X-RAY: CPT

## 2019-12-30 RX ADMIN — IOTHALAMATE MEGLUMINE 220 ML: 172 INJECTION URETERAL at 10:07

## 2020-01-13 NOTE — DISCHARGE SUMMARY
Discharge Summary    Admit date: 12/18/2019  Discharge date:  12/20/2019    Postoperative Diagnosis: PELVIC PAIN,  Irregular heavy bleeding  Active Hospital Problems    Diagnosis Date Noted    Post-operative pain 12/19/2019    Chronic pelvic pain in female 12/18/2019    Menorrhagia with irregular cycle 12/18/2019       Procedure:   Procedure(s):  LAPAROSCOPICALLY ASSISTED VAGINAL HYSTERECTOMY, RIGHT SALPINGECTOMY, CYSTOSCOPY, OPEN ENDED STENT PLACEMENT AND REMOVAL, REPAIR OF INTRAOPERATIVE BLADDER INJURY    Name: [address] [place] [address] Record Number: [id number]   YOB: 1985    Significant admission findings ( see H&P): CPP and irreg heavy bleeding. Hospital course: Patient was admitted, Laparoscopically assisted vaginal hysterectomy and right salpingectomy was performed. Cystoscopy showed inadvertent intraoperative bladder injury with a small hole noted centrally just above the trigone.  assistance was requested. Dr. Sandra Ferrari repaired the bladder injury, and the procedure was completed otherwise normally. .      Postop course was uneventful, with no significant fever, and vitals normal, except as mentioned below. She mobilized well, passed flatus, the Second day, and was discharged home at that time. Catheter was left in place and she was to keep this for 11 days. She was asked to see us in the office in 3 days. She was to see Dr. Sandra Ferrari on the 11th day postop in his office. She had good support at home, and will call if she has any problems before she sees us in the office. Incisions looked good prior to discharge with no sign of hematoma, swelling, or infection. Staples were not removed. She was discharged on pain meds, Septra. Catheter care was discussed, and she was told use of thigh bag prior to discharge, by nursing staff.     Findings:     Anesthesia: General    Pelvic findings at surgery: abnormal. Scarring of the uterovesical fold, to the anterior uterus and peritoneum, presumably from previous C-sections. Also omental adhesions to the anterior abdominal wall. Estimated Blood Loss: 75 cc    Relevant Lab:     ABO/Rh(D)   Date Value Ref Range Status   12/18/2019 O POSITIVE  Final     WBC   Date Value Ref Range Status   12/20/2019 9.8 4.6 - 13.2 K/uL Final   12/19/2019 14.4 (H) 4.6 - 13.2 K/uL Final     HGB   Date Value Ref Range Status   12/20/2019 9.8 (L) 12.0 - 16.0 g/dL Final   12/19/2019 11.7 (L) 12.0 - 16.0 g/dL Final     HCT   Date Value Ref Range Status   12/20/2019 32.0 (L) 35.0 - 45.0 % Final   12/19/2019 37.4 35.0 - 45.0 % Final     PLATELET   Date Value Ref Range Status   12/20/2019 251 135 - 420 K/uL Final   12/19/2019 271 135 - 420 K/uL Final         Discharge Medication List as of 12/20/2019 10:12 AM      CONTINUE these medications which have NOT CHANGED    Details   ergocalciferol (VITAMIN D2) 50,000 unit capsule Take 50,000 Units by mouth every Wednesday., Historical Med         STOP taking these medications       norelgestromin/ethin.estradiol Dora Estes TD) Comments:   Reason for Stopping:                Signed: Jeff Medeiros MD      January 13, 2020    .

## (undated) DEVICE — GARMENT,MEDLINE,DVT,INT,CALF,MED, GEN2: Brand: MEDLINE

## (undated) DEVICE — BLADE ELECTRODE: Brand: EDGE

## (undated) DEVICE — 40595 XL TRENDELENBURG POSITIONING KIT: Brand: 40595 XL TRENDELENBURG POSITIONING KIT

## (undated) DEVICE — CATH URET 5FRX70CM W/OPN END -- BX/20

## (undated) DEVICE — TOWEL,OR,DSP,ST,BLUE,STD,4/PK,20PK/CS: Brand: MEDLINE

## (undated) DEVICE — ENDOCUT SCISSOR TIP, DISPOSABLE: Brand: RENEW

## (undated) DEVICE — TOTAL TRAY, DB, 100% SILI FOLEY, 16FR 10: Brand: MEDLINE

## (undated) DEVICE — STERILE POLYISOPRENE POWDER-FREE SURGICAL GLOVES: Brand: PROTEXIS

## (undated) DEVICE — SOL IRRIGATION INJ NACL 0.9% 500ML BTL

## (undated) DEVICE — COVADERM PLUS: Brand: DEROYAL

## (undated) DEVICE — CYSTO/BLADDER IRRIGATION SET, REGULATING CLAMP

## (undated) DEVICE — SYRINGE,TOOMEY,IRRIGATION,70CC,STERILE: Brand: MEDLINE

## (undated) DEVICE — SYR 10ML CTRL LR LCK NSAF LF --

## (undated) DEVICE — MEDI-VAC NON-CONDUCTIVE SUCTION TUBING: Brand: CARDINAL HEALTH

## (undated) DEVICE — SUTURE ETHLN SZ 4-0 L18IN NONABSORBABLE BLK L19MM PS-2 3/8 1667H

## (undated) DEVICE — SUTURE VCRL + SZ 1 L18IN ABSRB UD L36MM CT-1 1/2 CIR VCP841D

## (undated) DEVICE — NEEDLE SPNL 22GAX3 1 2IN

## (undated) DEVICE — TOWEL SURG W17XL27IN STD BLU COT NONFENESTRATED PREWASHED

## (undated) DEVICE — REM POLYHESIVE ADULT PATIENT RETURN ELECTRODE: Brand: VALLEYLAB

## (undated) DEVICE — SPONGE LAP 18X18IN STRL -- 5/PK

## (undated) DEVICE — 4-PORT MANIFOLD: Brand: NEPTUNE 2

## (undated) DEVICE — GYN LAPAROSCOPY: Brand: MEDLINE INDUSTRIES, INC.

## (undated) DEVICE — SUTURE CHROMIC GUT SZ 2-0 L27IN ABSRB BRN L26MM SH 1/2 CIR G123H

## (undated) DEVICE — SUT VCRL + 1 36IN CT1 VIO --

## (undated) DEVICE — ROCKER SWITCH PENCIL HOLSTER: Brand: VALLEYLAB

## (undated) DEVICE — SUT VCRL + 2-0 27IN SH UD --

## (undated) DEVICE — SHEET,DRAPE,40X58,STERILE: Brand: MEDLINE

## (undated) DEVICE — NEEDLE COUNTER: Brand: DEROYAL

## (undated) DEVICE — DISPOSABLE SUCTION/IRRIGATOR TUBE SET WITH TIP: Brand: AHTO

## (undated) DEVICE — TABLE COVER: Brand: CONVERTORS

## (undated) DEVICE — MEDI-VAC SUCTION HANDLE REGULAR CAPACITY: Brand: CARDINAL HEALTH

## (undated) DEVICE — INTENDED FOR TISSUE SEPARATION, AND OTHER PROCEDURES THAT REQUIRE A SHARP SURGICAL BLADE TO PUNCTURE OR CUT.: Brand: BARD-PARKER ® CARBON RIB-BACK BLADES

## (undated) DEVICE — SUT VCRL + 0 36IN UR6 VIO --

## (undated) DEVICE — GDWIRE 3CM FLX-TIP 0.038X150CM -- BX/5 SENSOR

## (undated) DEVICE — TROCAR: Brand: KII® OPTICAL ACCESS SYSTEM

## (undated) DEVICE — TROCAR: Brand: KII® SLEEVE

## (undated) DEVICE — LAPAROSCOPIC TROCAR SLEEVE/SINGLE USE: Brand: KII® OPTICAL ACCESS SYSTEM

## (undated) DEVICE — INSUFFLATION NEEDLE TO ESTABLISH PNEUMOPERITONEUM.: Brand: INSUFFLATION NEEDLE

## (undated) DEVICE — CATH URETH INTMIT ROB 16FR FUN -- CONVERT TO ITEM 179520